# Patient Record
Sex: MALE | Race: BLACK OR AFRICAN AMERICAN
[De-identification: names, ages, dates, MRNs, and addresses within clinical notes are randomized per-mention and may not be internally consistent; named-entity substitution may affect disease eponyms.]

---

## 2020-01-09 ENCOUNTER — NURSE TRIAGE (OUTPATIENT)
Dept: OTHER | Facility: CLINIC | Age: 40
End: 2020-01-09

## 2020-01-09 NOTE — TELEPHONE ENCOUNTER
Reason for Disposition   DOUBLE DOSE (an extra dose or lesser amount) of over-the-counter (OTC) drug and  NO symptoms    Protocols used: POISONING-ADULT-[place]    Pt calling for MMO benefit. States he was just recently prescribed concerta to replace his addderall. Today he took his concerta at 6070. Pt states he forgot and took his adderall this afternoon. Pt has no symptoms currently. He has not contacted the provider that prescribed these medications. Encouraged pt to call prescribing provider. Also gave pt Poison Control number. He verbalizes understanding and has no other questions or concerns. Please do not respond to the triage nurse through this encounter. Any subsequent communication should be directly with the patient.